# Patient Record
Sex: MALE | Race: WHITE | NOT HISPANIC OR LATINO | Employment: FULL TIME | URBAN - NONMETROPOLITAN AREA
[De-identification: names, ages, dates, MRNs, and addresses within clinical notes are randomized per-mention and may not be internally consistent; named-entity substitution may affect disease eponyms.]

---

## 2017-03-17 ENCOUNTER — HOSPITAL ENCOUNTER (EMERGENCY)
Facility: HOSPITAL | Age: 57
Discharge: HOME | End: 2017-03-17
Attending: EMERGENCY MEDICINE | Admitting: EMERGENCY MEDICINE

## 2017-03-17 ENCOUNTER — APPOINTMENT (OUTPATIENT)
Dept: GENERAL RADIOLOGY | Facility: HOSPITAL | Age: 57
End: 2017-03-17

## 2017-03-17 ENCOUNTER — APPOINTMENT (OUTPATIENT)
Dept: CT IMAGING | Facility: HOSPITAL | Age: 57
End: 2017-03-17

## 2017-03-17 VITALS
TEMPERATURE: 97.9 F | DIASTOLIC BLOOD PRESSURE: 69 MMHG | SYSTOLIC BLOOD PRESSURE: 135 MMHG | RESPIRATION RATE: 15 BRPM | WEIGHT: 235 LBS | HEIGHT: 68 IN | HEART RATE: 75 BPM | OXYGEN SATURATION: 98 % | BODY MASS INDEX: 35.61 KG/M2

## 2017-03-17 DIAGNOSIS — G44.219 EPISODIC TENSION-TYPE HEADACHE, NOT INTRACTABLE: ICD-10-CM

## 2017-03-17 DIAGNOSIS — R07.89 CHEST PAIN, ATYPICAL: Primary | ICD-10-CM

## 2017-03-17 LAB
ALBUMIN SERPL-MCNC: 5.1 G/DL (ref 3.5–5)
ALBUMIN/GLOB SERPL: 1.8 G/DL (ref 1.5–2.5)
ALP SERPL-CCNC: 75 U/L (ref 40–129)
ALT SERPL W P-5'-P-CCNC: 32 U/L (ref 10–44)
ANION GAP SERPL CALCULATED.3IONS-SCNC: 8.1 MMOL/L (ref 3.6–11.2)
AST SERPL-CCNC: 32 U/L (ref 10–34)
BASOPHILS # BLD AUTO: 0.02 10*3/MM3 (ref 0–0.3)
BASOPHILS NFR BLD AUTO: 0.4 % (ref 0–2)
BILIRUB SERPL-MCNC: 0.8 MG/DL (ref 0.2–1.8)
BUN BLD-MCNC: 18 MG/DL (ref 7–21)
BUN/CREAT SERPL: 16.1 (ref 7–25)
CALCIUM SPEC-SCNC: 10.2 MG/DL (ref 7.7–10)
CHLORIDE SERPL-SCNC: 99 MMOL/L (ref 99–112)
CO2 SERPL-SCNC: 27.9 MMOL/L (ref 24.3–31.9)
CREAT BLD-MCNC: 1.12 MG/DL (ref 0.43–1.29)
DEPRECATED RDW RBC AUTO: 41.6 FL (ref 37–54)
EOSINOPHIL # BLD AUTO: 0.16 10*3/MM3 (ref 0–0.7)
EOSINOPHIL NFR BLD AUTO: 3.1 % (ref 0–5)
ERYTHROCYTE [DISTWIDTH] IN BLOOD BY AUTOMATED COUNT: 13.5 % (ref 11.5–14.5)
GFR SERPL CREATININE-BSD FRML MDRD: 68 ML/MIN/1.73
GLOBULIN UR ELPH-MCNC: 2.9 GM/DL
GLUCOSE BLD-MCNC: 264 MG/DL (ref 70–110)
HCT VFR BLD AUTO: 46.8 % (ref 42–52)
HGB BLD-MCNC: 15.5 G/DL (ref 14–18)
IMM GRANULOCYTES # BLD: 0.03 10*3/MM3 (ref 0–0.03)
IMM GRANULOCYTES NFR BLD: 0.6 % (ref 0–0.5)
INR PPP: 0.92 (ref 0.8–1.1)
LYMPHOCYTES # BLD AUTO: 1.19 10*3/MM3 (ref 1–3)
LYMPHOCYTES NFR BLD AUTO: 23 % (ref 21–51)
MCH RBC QN AUTO: 27.8 PG (ref 27–33)
MCHC RBC AUTO-ENTMCNC: 33.1 G/DL (ref 33–37)
MCV RBC AUTO: 84 FL (ref 80–94)
MONOCYTES # BLD AUTO: 0.55 10*3/MM3 (ref 0.1–0.9)
MONOCYTES NFR BLD AUTO: 10.6 % (ref 0–10)
NEUTROPHILS # BLD AUTO: 3.22 10*3/MM3 (ref 1.4–6.5)
NEUTROPHILS NFR BLD AUTO: 62.3 % (ref 30–70)
OSMOLALITY SERPL CALC.SUM OF ELEC: 281.2 MOSM/KG (ref 273–305)
PLATELET # BLD AUTO: 150 10*3/MM3 (ref 130–400)
PMV BLD AUTO: 10.8 FL (ref 6–10)
POTASSIUM BLD-SCNC: 4.4 MMOL/L (ref 3.5–5.3)
PROT SERPL-MCNC: 8 G/DL (ref 6–8)
PROTHROMBIN TIME: 10.2 SECONDS (ref 9.8–11.9)
RBC # BLD AUTO: 5.57 10*6/MM3 (ref 4.7–6.1)
SODIUM BLD-SCNC: 135 MMOL/L (ref 135–153)
TROPONIN I SERPL-MCNC: <0.006 NG/ML
TSH SERPL DL<=0.05 MIU/L-ACNC: 0.01 MIU/ML (ref 0.55–4.78)
WBC NRBC COR # BLD: 5.17 10*3/MM3 (ref 4.5–12.5)
WHOLE BLOOD HOLD SPECIMEN: NORMAL
WHOLE BLOOD HOLD SPECIMEN: NORMAL

## 2017-03-17 PROCEDURE — 85610 PROTHROMBIN TIME: CPT | Performed by: NURSE PRACTITIONER

## 2017-03-17 PROCEDURE — 71010 HC CHEST PA OR AP: CPT

## 2017-03-17 PROCEDURE — 80053 COMPREHEN METABOLIC PANEL: CPT | Performed by: NURSE PRACTITIONER

## 2017-03-17 PROCEDURE — 70450 CT HEAD/BRAIN W/O DYE: CPT

## 2017-03-17 PROCEDURE — 99285 EMERGENCY DEPT VISIT HI MDM: CPT

## 2017-03-17 PROCEDURE — 71010 XR CHEST 1 VW: CPT | Performed by: RADIOLOGY

## 2017-03-17 PROCEDURE — 93010 ELECTROCARDIOGRAM REPORT: CPT | Performed by: INTERNAL MEDICINE

## 2017-03-17 PROCEDURE — 93005 ELECTROCARDIOGRAM TRACING: CPT | Performed by: NURSE PRACTITIONER

## 2017-03-17 PROCEDURE — 85025 COMPLETE CBC W/AUTO DIFF WBC: CPT | Performed by: NURSE PRACTITIONER

## 2017-03-17 PROCEDURE — 84443 ASSAY THYROID STIM HORMONE: CPT | Performed by: NURSE PRACTITIONER

## 2017-03-17 PROCEDURE — 84484 ASSAY OF TROPONIN QUANT: CPT | Performed by: NURSE PRACTITIONER

## 2017-03-17 PROCEDURE — 70450 CT HEAD/BRAIN W/O DYE: CPT | Performed by: RADIOLOGY

## 2017-03-17 RX ORDER — SODIUM CHLORIDE 0.9 % (FLUSH) 0.9 %
10 SYRINGE (ML) INJECTION AS NEEDED
Status: DISCONTINUED | OUTPATIENT
Start: 2017-03-17 | End: 2017-03-17 | Stop reason: HOSPADM

## 2017-03-17 RX ORDER — METOPROLOL TARTRATE 100 MG/1
100 TABLET ORAL 2 TIMES DAILY
COMMUNITY

## 2017-03-17 RX ORDER — GLIMEPIRIDE 4 MG/1
4 TABLET ORAL 2 TIMES DAILY
COMMUNITY

## 2017-03-17 RX ORDER — OMEPRAZOLE 40 MG/1
40 CAPSULE, DELAYED RELEASE ORAL DAILY
COMMUNITY
End: 2021-02-25 | Stop reason: DRUGHIGH

## 2017-03-17 RX ORDER — ASPIRIN 81 MG/1
81 TABLET, CHEWABLE ORAL DAILY
COMMUNITY

## 2017-03-17 RX ORDER — ATORVASTATIN CALCIUM 40 MG/1
40 TABLET, FILM COATED ORAL DAILY
COMMUNITY
End: 2021-02-25 | Stop reason: DRUGHIGH

## 2017-03-17 RX ORDER — LOSARTAN POTASSIUM 25 MG/1
25 TABLET ORAL DAILY
COMMUNITY
End: 2021-02-25 | Stop reason: DRUGHIGH

## 2017-03-17 RX ORDER — LEVOTHYROXINE SODIUM 0.15 MG/1
150 TABLET ORAL DAILY
COMMUNITY
End: 2021-02-25 | Stop reason: DRUGHIGH
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
## 2017-03-17 NOTE — DISCHARGE INSTRUCTIONS

## 2021-01-15 ENCOUNTER — TELEPHONE (OUTPATIENT)
Dept: SURGERY | Facility: CLINIC | Age: 61
End: 2021-01-15

## 2021-01-15 NOTE — TELEPHONE ENCOUNTER
Patient is requesting bariatric consult. Took down patient's information and bariatric packet will be mailed to him.

## 2021-01-18 ENCOUNTER — TELEPHONE (OUTPATIENT)
Dept: SURGERY | Facility: CLINIC | Age: 61
End: 2021-01-18

## 2021-02-01 ENCOUNTER — TELEPHONE (OUTPATIENT)
Dept: SURGERY | Facility: CLINIC | Age: 61
End: 2021-02-01

## 2021-02-25 ENCOUNTER — OFFICE VISIT (OUTPATIENT)
Dept: SURGERY | Facility: CLINIC | Age: 61
End: 2021-02-25

## 2021-02-25 VITALS
BODY MASS INDEX: 36.34 KG/M2 | HEIGHT: 68 IN | DIASTOLIC BLOOD PRESSURE: 61 MMHG | HEART RATE: 81 BPM | TEMPERATURE: 98 F | WEIGHT: 239.8 LBS | RESPIRATION RATE: 17 BRPM | OXYGEN SATURATION: 98 % | SYSTOLIC BLOOD PRESSURE: 142 MMHG

## 2021-02-25 DIAGNOSIS — E66.01 MORBID OBESITY (HCC): Primary | ICD-10-CM

## 2021-02-25 PROCEDURE — 99204 OFFICE O/P NEW MOD 45 MIN: CPT | Performed by: SURGERY

## 2021-02-25 RX ORDER — EMPAGLIFLOZIN 25 MG/1
TABLET, FILM COATED ORAL
COMMUNITY

## 2021-02-25 RX ORDER — ATORVASTATIN CALCIUM 80 MG/1
TABLET, FILM COATED ORAL
COMMUNITY

## 2021-02-25 RX ORDER — LOSARTAN POTASSIUM AND HYDROCHLOROTHIAZIDE 12.5; 1 MG/1; MG/1
TABLET ORAL
COMMUNITY

## 2021-02-25 RX ORDER — SODIUM CHLORIDE 0.9 % (FLUSH) 0.9 %
10 SYRINGE (ML) INJECTION AS NEEDED
Status: CANCELLED | OUTPATIENT
Start: 2021-03-30

## 2021-02-25 RX ORDER — ERGOCALCIFEROL 1.25 MG/1
50000 CAPSULE ORAL WEEKLY
COMMUNITY
Start: 2021-02-04

## 2021-02-25 RX ORDER — METFORMIN HYDROCHLORIDE 500 MG/1
TABLET, EXTENDED RELEASE ORAL
COMMUNITY

## 2021-02-25 RX ORDER — SODIUM CHLORIDE 0.9 % (FLUSH) 0.9 %
3 SYRINGE (ML) INJECTION EVERY 12 HOURS SCHEDULED
Status: CANCELLED | OUTPATIENT
Start: 2021-03-30

## 2021-02-25 RX ORDER — PANTOPRAZOLE SODIUM 40 MG/1
40 TABLET, DELAYED RELEASE ORAL DAILY
COMMUNITY
Start: 2021-01-03

## 2021-02-25 RX ORDER — LEVOCETIRIZINE DIHYDROCHLORIDE 5 MG/1
TABLET, FILM COATED ORAL
COMMUNITY

## 2021-02-25 RX ORDER — PREGABALIN 150 MG/1
CAPSULE ORAL
COMMUNITY

## 2021-02-25 RX ORDER — LEVOTHYROXINE SODIUM 112 UG/1
TABLET ORAL
COMMUNITY

## 2021-02-25 NOTE — PROGRESS NOTES
Date of Service: 2/25/2021    Subjective   Venu Peralta is a 60 y.o. male is being seen for consultation for morbid  obesity today at the request of his primary care physician    Chief complaint: Obesity    Venu Peralta is a 60 y.o. morbidly obese male with poorly controlled diabetes mellitus, hypertension, hyperlipidemia, coronary artery disease, gastroesophageal reflux disease controlled on omeprazole, obstructive sleep apnea requiring CPAP.  Patient has been obese since childhood to a maximum weight of 300 pounds.  He has siblings, 1 of which had a sleeve gastrectomy and the other had a gastric bypass, both with good results.  He has not trialed any diets.  He did however get to his lowest 218 pounds and did notice his glucose measurements decreasing slightly.  The patient's main concern is getting off of his medications for diabetes and hypertension.  Due to his work as a , he wants to avoid insulin and states his last hemoglobin A1c was upwards of 10%.      General diet: Drinks upwards of 10 diet sodas per day.  While working, his meal choices are limited to fast food.    Exercise: Patient is not very active.    Takes a daily aspirin.  Reports exertional chest pain.  Has had heart catheterizations in the past but no stenting.  Has a cardiologist in [place] but has forgotten the name.  Has obstructive sleep apnea requiring CPAP  Poorly controlled diabetes mellitus, the patient is avoiding taking insulin due to his work.  States his last hemoglobin A1c was upwards of 10%  Reports a history of a hiatal hernia with gastroesophageal reflux disease that is well controlled on omeprazole      02/25/21  1304   Weight: 109 kg (239 lb 12.8 oz)     Height 173 cm  Body mass index is 36.47 kg/m².    Adjusted Ideal Body Weight: 186 pounds  Excess Body Weight (Weight-IBW): 53 pounds    Past Medical History:   Diagnosis Date   • CAD (coronary artery disease)    • Diabetes mellitus (CMS/[facility])    • Disease of  "thyroid gland    • Hyperlipidemia    • Hypertension    • Sleep apnea    • Stomach ulcer        Past Surgical History:   Procedure Laterality Date   • CARDIAC CATHETERIZATION     • CHOLECYSTECTOMY     • CIRCUMCISION     • THYROID SURGERY           Family History   Problem Relation Age of Onset   • Cancer Mother    • Hypertension Father    • Heart disease Father    • Hypertension Sister    • Diabetes Sister    • Hypertension Brother    • Heart disease Brother    • Diabetes Brother          Social History     Socioeconomic History   • Marital status:      Spouse name: Not on file   • Number of children: Not on file   • Years of education: Not on file   • Highest education level: Not on file   Tobacco Use   • Smoking status: Never Smoker   • Smokeless tobacco: Never Used   Substance and Sexual Activity   • Alcohol use: No   • Drug use: No   • Sexual activity: Defer                Review of Systems        Constitutional: No fevers, chills.  Reports malaise. No unintentional weight loss.  Reports weight gain   Eyes: Reports chronic vision problems   Cardiovascular: Reports exertional chest pain with activity.  Occasional ankle swelling   Respiratory: Denies cough or shortness of breath   Abdominal/Gastrointestinal: Reports gastroesophageal reflux disease without abdominal pain or vomiting.   Genitourinary: Denies dysuria or hematuria.  Reports urinary hesitancy   Musculoskeletal: Reports multiple chronic joint pains              Skin: No lesions or rashes   Psychiatric: No recent mood changes   Neurologic: No paresthesias or loss of function.  Reports intermittent dizziness.         Objective     Physical Exam:      02/25/21  1304   Weight: 109 kg (239 lb 12.8 oz)    Body mass index is 36.47 kg/m².  Constitution: /61   Pulse 81   Temp 98 °F (36.7 °C)   Resp 17   Ht 172.7 cm (67.99\")   Wt 109 kg (239 lb 12.8 oz)   SpO2 98%   BMI 36.47 kg/m²  . No acute distress. Morbidly  obese  Head: Normocephalic, " atraumatic.   Eyes: Aligned without strabismus. Conjunctiva noninjected   Ears, Nose, Mouth: No lesions appreciated   CV: Rhythm  and rate regular   Respiratory: Symmetric chest expansion. No respiratory distress.   Gastrointestinal:  soft, nondistended, nontender  Skin:  No cyanosis, clubbing or edema bilaterally    Lymphatics: No abnormal cervical or supraclavicular adenopathy appreciated   Neurologic: No gross deficits   Psychiatric: alert and oriented x3            Assessment     Venu Peralta is a 60 y.o. morbidly  obese (Body mass index is 36.47 kg/m².) male with poorly controlled diabetes mellitus, hypertension, hyperlipidemia, coronary artery disease, gastroesophageal reflux disease with reported hiatal hernia, obstructive sleep apnea requiring CPAP.    We had an extensive discussion regarding the risks of sleeve gastrectomy, manage expectations, and the preoperative work-up.    The patient will be referred to nutrition to discuss dietary modifications, as I will expect the patient to lose 10% (5 to 6 pounds) of their excess body weight on their own prior to being offered sleeve gastrectomy.  This will help the patient establish positive dietary habits that will help them in the long run and maximize the patient's weight loss post surgery.  I think the crux of the patient's issue may be related to work as a  and lack of access to healthy meal choices.  There is a possibility that dietary modifications alone with nutritionist's input may yield positive results since the patient only has roughly 50 pounds of excess body weight.    The patient will be referred to psychiatry to better understand the patient's motives for weight loss, coping mechanisms, and possible contributing factors to the patient's morbid  obesity    In the meantime, I will obtain an upper GI study to rule out hiatal hernia which may make the patient a poor surgical candidate for sleeve gastrectomy if there is a large hiatal  hernia, as well as upper endoscopy.    The patient does report and review of systems exertional chest pain.  He has had several heart caths in the past but never been stented.  I will ask the patient's cardiologist to clear him for surgery, and determine the need for stress testing         Félix Covington MD  [facility]

## 2021-02-26 PROBLEM — E66.01 MORBID OBESITY (HCC): Status: ACTIVE | Noted: 2021-02-26

## 2024-09-24 ENCOUNTER — SPECIALTY PHARMACY (OUTPATIENT)
Dept: GENERAL RADIOLOGY | Facility: HOSPITAL | Age: 64
End: 2024-09-24
Payer: MEDICAID

## 2024-09-24 ENCOUNTER — OFFICE VISIT (OUTPATIENT)
Age: 64
End: 2024-09-24
Payer: MEDICAID

## 2024-09-24 VITALS
OXYGEN SATURATION: 98 % | SYSTOLIC BLOOD PRESSURE: 128 MMHG | HEART RATE: 80 BPM | WEIGHT: 235 LBS | HEIGHT: 68 IN | BODY MASS INDEX: 35.61 KG/M2 | DIASTOLIC BLOOD PRESSURE: 68 MMHG

## 2024-09-24 DIAGNOSIS — Z79.4 UNCONTROLLED DIABETES MELLITUS WITH HYPERGLYCEMIA, WITH LONG-TERM CURRENT USE OF INSULIN: Primary | ICD-10-CM

## 2024-09-24 DIAGNOSIS — E11.65 UNCONTROLLED DIABETES MELLITUS WITH HYPERGLYCEMIA, WITH LONG-TERM CURRENT USE OF INSULIN: Primary | ICD-10-CM

## 2024-09-24 PROCEDURE — 99203 OFFICE O/P NEW LOW 30 MIN: CPT | Performed by: INTERNAL MEDICINE

## 2024-09-24 RX ORDER — INSULIN LISPRO 200 [IU]/ML
30 INJECTION, SOLUTION SUBCUTANEOUS
Qty: 90 ML | Refills: 1 | Status: SHIPPED | OUTPATIENT
Start: 2024-09-24

## 2024-09-24 RX ORDER — METFORMIN HCL 500 MG
1000 TABLET, EXTENDED RELEASE 24 HR ORAL
Qty: 180 TABLET | Refills: 1 | Status: SHIPPED | OUTPATIENT
Start: 2024-09-24

## 2024-09-24 RX ORDER — INSULIN GLARGINE 100 [IU]/ML
55 INJECTION, SOLUTION SUBCUTANEOUS 2 TIMES DAILY
Qty: 90 ML | Refills: 1 | Status: SHIPPED | OUTPATIENT
Start: 2024-09-24

## 2024-10-07 ENCOUNTER — SPECIALTY PHARMACY (OUTPATIENT)
Age: 64
End: 2024-10-07
Payer: MEDICAID

## 2024-10-07 NOTE — PROGRESS NOTES
Specialty Pharmacy Patient Management Program  Endocrinology Initial Fill Outreach      Venu is a 64 y.o. male contacted today regarding initial fill of his medication(s).    Specialty medication(s) and dose(s) confirmed: Jardiance, Mounjaro.    Delivery Questions      Flowsheet Row Most Recent Value   Delivery method UPS   Delivery address verified with patient/caregiver? Yes   Delivery address Home   Number of medications in delivery 4   Medication(s) being filled and delivered Empagliflozin, Insulin Lispro, Tirzepatide, Metformin Hcl (Biguanides)   Doses left of specialty medications inifital fill   Copay verified? Yes   Copay amount $0   Copay form of payment No copayment ($0)   Ship Date 10/8   Delivery Date 10/9   Signature Required Yes            Follow-Up: 90d    Gustavo Bradford CPhT  Pharmacy Care Coordinator, Endocrinology  10/7/2024  11:40 EDT

## 2024-10-28 ENCOUNTER — SPECIALTY PHARMACY (OUTPATIENT)
Age: 64
End: 2024-10-28
Payer: MEDICAID

## 2024-10-28 NOTE — PROGRESS NOTES
Specialty Pharmacy Patient Management Program  Endocrinology Initial Fill Outreach      Venu is a 64 y.o. male contacted today regarding initial fill of his medication(s).    Specialty medication(s) and dose(s) confirmed: Lantus SoloStar.    Delivery Questions      Flowsheet Row Most Recent Value   Delivery method UPS   Delivery address verified with patient/caregiver? Yes   Delivery address Home   Number of medications in delivery 1   Medication(s) being filled and delivered Insulin Glargine (Lantus SoloStar)   Doses left of specialty medications inifital fill   Copay verified? Yes   Copay amount $0   Copay form of payment No copayment ($0)   Ship Date 10/29   Delivery Date 10/30   Signature Required Yes            Follow-Up: 82d    Gustavo Bradford CPhT  Pharmacy Care Coordinator, Endocrinology  10/28/2024  13:31 EDT

## 2024-10-30 ENCOUNTER — SPECIALTY PHARMACY (OUTPATIENT)
Age: 64
End: 2024-10-30
Payer: MEDICAID

## 2024-11-25 ENCOUNTER — SPECIALTY PHARMACY (OUTPATIENT)
Age: 64
End: 2024-11-25
Payer: MEDICAID

## 2024-12-19 ENCOUNTER — SPECIALTY PHARMACY (OUTPATIENT)
Age: 64
End: 2024-12-19
Payer: MEDICAID

## 2024-12-30 ENCOUNTER — SPECIALTY PHARMACY (OUTPATIENT)
Dept: GENERAL RADIOLOGY | Facility: HOSPITAL | Age: 64
End: 2024-12-30
Payer: MEDICAID

## 2024-12-30 RX ORDER — PEN NEEDLE, DIABETIC 31 GX5/16"
1 NEEDLE, DISPOSABLE MISCELLANEOUS
Qty: 500 EACH | Refills: 1 | Status: SHIPPED | OUTPATIENT
Start: 2024-12-30

## 2024-12-30 NOTE — PROGRESS NOTES
Specialty Pharmacy Patient Management Program  Per Protocol Prescription Order/Refill     Patient currently fills medications at [facility] and is enrolled in an Endocrinology Patient Management Program.     Requested Prescriptions     Pending Prescriptions Disp Refills    Insulin Pen Needle (B-D ULTRAFINE III SHORT PEN) 31G X 8 MM misc 500 each 1     Sig: Use 1 Needle 5 (Five) Times a Day.     Prescription orders above were sent to the pharmacy per Collaborative Care Agreement Protocol.     Erick Sanchez, PharmD  Clinical Specialty Pharmacist, Endocrinology  12/30/2024  11:40 EST

## 2025-01-10 ENCOUNTER — OFFICE VISIT (OUTPATIENT)
Age: 65
End: 2025-01-10
Payer: MEDICAID

## 2025-01-10 VITALS
SYSTOLIC BLOOD PRESSURE: 128 MMHG | OXYGEN SATURATION: 98 % | DIASTOLIC BLOOD PRESSURE: 62 MMHG | HEIGHT: 68 IN | BODY MASS INDEX: 34.25 KG/M2 | WEIGHT: 226 LBS | HEART RATE: 76 BPM

## 2025-01-10 DIAGNOSIS — Z79.4 UNCONTROLLED DIABETES MELLITUS WITH HYPERGLYCEMIA, WITH LONG-TERM CURRENT USE OF INSULIN: Primary | ICD-10-CM

## 2025-01-10 DIAGNOSIS — E11.65 UNCONTROLLED DIABETES MELLITUS WITH HYPERGLYCEMIA, WITH LONG-TERM CURRENT USE OF INSULIN: Primary | ICD-10-CM

## 2025-01-10 PROCEDURE — 99213 OFFICE O/P EST LOW 20 MIN: CPT | Performed by: INTERNAL MEDICINE

## 2025-01-10 PROCEDURE — 1159F MED LIST DOCD IN RCRD: CPT | Performed by: INTERNAL MEDICINE

## 2025-01-10 PROCEDURE — 1160F RVW MEDS BY RX/DR IN RCRD: CPT | Performed by: INTERNAL MEDICINE

## 2025-01-10 RX ORDER — FERROUS SULFATE 325(65) MG
325 TABLET ORAL EVERY 12 HOURS SCHEDULED
COMMUNITY
Start: 2024-11-22

## 2025-01-10 RX ORDER — VALACYCLOVIR HYDROCHLORIDE 1 G/1
1000 TABLET, FILM COATED ORAL DAILY
COMMUNITY
Start: 2024-12-30

## 2025-01-10 RX ORDER — FLUTICASONE PROPIONATE 50 MCG
1 SPRAY, SUSPENSION (ML) NASAL DAILY
COMMUNITY
Start: 2024-09-13

## 2025-01-10 RX ORDER — BENZONATATE 100 MG/1
100 CAPSULE ORAL 2 TIMES DAILY PRN
COMMUNITY
Start: 2024-12-30

## 2025-01-10 RX ORDER — FOLIC ACID 0.8 MG
1 TABLET ORAL DAILY
COMMUNITY
Start: 2024-12-29 | End: 2025-03-29

## 2025-01-10 RX ORDER — SUCRALFATE 1 G/1
1 TABLET ORAL 4 TIMES DAILY
COMMUNITY
Start: 2024-11-22 | End: 2025-01-21

## 2025-01-10 RX ORDER — PROCHLORPERAZINE 25 MG/1
SUPPOSITORY RECTAL
COMMUNITY
Start: 2024-12-06

## 2025-01-10 RX ORDER — ROSUVASTATIN CALCIUM 40 MG/1
40 TABLET, COATED ORAL NIGHTLY
COMMUNITY
Start: 2024-12-30

## 2025-01-10 RX ORDER — AMLODIPINE BESYLATE 10 MG/1
10 TABLET ORAL DAILY
COMMUNITY
Start: 2024-12-19

## 2025-01-10 RX ORDER — DULOXETIN HYDROCHLORIDE 60 MG/1
CAPSULE, DELAYED RELEASE ORAL DAILY
COMMUNITY
Start: 2024-12-30

## 2025-01-10 RX ORDER — FENOFIBRATE 145 MG/1
145 TABLET, COATED ORAL DAILY
COMMUNITY
Start: 2024-12-19

## 2025-01-10 RX ORDER — LOSARTAN POTASSIUM 100 MG/1
100 TABLET ORAL DAILY
COMMUNITY
Start: 2024-12-30

## 2025-01-10 NOTE — ASSESSMENT & PLAN NOTE
UNSTABLE. STILL NOT AT GOAL  THE PLAN IS TO INCREASE MOUNJAOR TO MAX DOSE AND START THE PROCESS TOWARDS PUMP THERAPY. I RECOMMEND THE TANDEM PUMP FOR HIM

## 2025-01-10 NOTE — PROGRESS NOTES
"     Office Note      Date: 01/10/2025  Patient Name: Venu Peralta  MRN: [id number]  : 1960    Chief Complaint   Patient presents with    Uncontrolled diabetes mellitus with hyperglycemia, with cory       History of Present Illness:   Venu Peralta is a 64 y.o. male who presents for Diabetes type 2.   Current RX humalog/ lantus/ mounjaro jardiance - THE PLAN HAS BEEN TO INCREASE THE MOUNJARO BUT IT HAS BEEN TOO SOON   He is on arb and statin     Bg checks are done: G6.. HE LEFT IT AT HOME SO WE COULD NOT DOWNLOAD BUT THE AVERAGE IS STILL 180-200 AS HE RECALLS   Hypoglycemia :NOTHING SERIOUS       Last A1c: 8.0 IN NOVEMBER       Changes in health since last visit: NONE . Last eye exam WITHIN THE LAST 6 MONTHS .    Subjective              Review of Systems:   Review of Systems   Respiratory:  Negative for chest tightness and shortness of breath.        The following portions of the patient's history were reviewed and updated as appropriate: allergies, current medications, past family history, past medical history, past social history, past surgical history, and problem list.    Objective     Visit Vitals  /62 (BP Location: Left arm, Patient Position: Sitting)   Pulse 76   Ht 172.7 cm (68\")   Wt 103 kg (226 lb)   SpO2 98%   BMI 34.36 kg/m²           Physical Exam:  Physical Exam  Vitals reviewed.   Constitutional:       Appearance: Normal appearance. He is normal weight.   Cardiovascular:      Pulses:           Dorsalis pedis pulses are 2+ on the right side and 2+ on the left side.        Posterior tibial pulses are 2+ on the right side and 2+ on the left side.   Musculoskeletal:      Right foot: Normal range of motion. No deformity, bunion, Charcot foot, foot drop or prominent metatarsal heads.      Left foot: Normal range of motion. No deformity, bunion, Charcot foot, foot drop or prominent metatarsal heads.   Feet:      Right foot:      Protective Sensation: 10 sites tested.  6 sites sensed.      " Skin integrity: Skin integrity normal. Dry skin present.      Toenail Condition: Right toenails are normal.      Left foot:      Protective Sensation: 10 sites tested.  6 sites sensed.      Skin integrity: Skin integrity normal. Dry skin present.      Toenail Condition: Left toenails are normal.      Comments: Diabetic Foot Exam Performed    Neurological:      Mental Status: He is alert.   Psychiatric:         Mood and Affect: Mood normal.         Behavior: Behavior normal.         Thought Content: Thought content normal.         Judgment: Judgment normal.          Assessment / Plan      Assessment & Plan:  Problem List Items Addressed This Visit       Uncontrolled diabetes mellitus with hyperglycemia, with long-term current use of insulin - Primary    Current Assessment & Plan     UNSTABLE. STILL NOT AT GOAL  THE PLAN IS TO INCREASE MOUNJAOR TO MAX DOSE AND START THE PROCESS TOWARDS PUMP THERAPY. I RECOMMEND THE TANDEM PUMP FOR HIM         Relevant Medications    empagliflozin (Jardiance) 25 MG tablet tablet    Tirzepatide 12.5 MG/0.5ML solution auto-injector    Insulin Lispro (HumaLOG KwikPen) 200 UNIT/ML solution pen-injector    Insulin Glargine (Lantus SoloStar) 100 UNIT/ML injection pen    metFORMIN ER (GLUCOPHAGE-XR) 500 MG 24 hr tablet         Electronically signed by : Alexander Butts MD  01/10/2025

## 2025-02-12 ENCOUNTER — SPECIALTY PHARMACY (OUTPATIENT)
Dept: GENERAL RADIOLOGY | Facility: HOSPITAL | Age: 65
End: 2025-02-12
Payer: MEDICAID

## 2025-02-12 RX ORDER — PROCHLORPERAZINE 25 MG/1
SUPPOSITORY RECTAL
Qty: 3 EACH | Refills: 5 | Status: SHIPPED | OUTPATIENT
Start: 2025-02-12

## 2025-02-12 NOTE — PROGRESS NOTES
Specialty Pharmacy Patient Management Program  Per Protocol Prescription Order/Refill     Patient currently fills medications at [facility] and is enrolled in an Endocrinology Patient Management Program.     Requested Prescriptions     Pending Prescriptions Disp Refills    Continuous Glucose Sensor (Dexcom G6 Sensor) 3 each 5     Sig: Inject  under the skin into the appropriate area as directed Every 10 (Ten) Days.     Prescription orders above were sent to the pharmacy per Collaborative Care Agreement Protocol.     Erick Sanchez, PharmD  Clinical Specialty Pharmacist, Endocrinology  2/12/2025  14:12 EST

## 2025-02-21 ENCOUNTER — SPECIALTY PHARMACY (OUTPATIENT)
Age: 65
End: 2025-02-21
Payer: MEDICAID

## 2025-02-21 NOTE — PROGRESS NOTES
Specialty Pharmacy Patient Management Program  Endocrinology Refill Outreach      Venu is a 64 y.o. male contacted today regarding refills of his medication(s).    Specialty medication(s) and dose(s) confirmed: Lantus, Mounjaro.    Refill Questions      Flowsheet Row Most Recent Value   Changes to allergies? No   Changes to medications? No   New conditions or infections since last clinic visit No   Unplanned office visit, urgent care, ED, or hospital admission in the last 4 weeks  No   How does patient/caregiver feel medication is working? Very good   Financial problems or insurance changes  No   Since the previous refill, were any specialty medication doses or scheduled injections missed or delayed?  No   Does this patient require a clinical escalation to a pharmacist? No          Delivery Questions      Flowsheet Row Most Recent Value   Delivery method UPS   Delivery address verified with patient/caregiver? Yes   Delivery address Home   Number of medications in delivery 3   Medication(s) being filled and delivered Insulin Lispro (HumaLOG KwikPen), Insulin Glargine (Lantus SoloStar), Tirzepatide (MOUNJARO)   Copay verified? Yes   Copay amount $0   Copay form of payment No copayment ($0)   Delivery Date Selection 02/22/25   Signature Required Yes            Follow-Up: 28d    Gustavo Bradford CPhT  Pharmacy Care Coordinator, Endocrinology  2/21/2025  09:28 EST

## 2025-02-26 RX ORDER — INSULIN LISPRO 100 [IU]/ML
INJECTION, SOLUTION INTRAVENOUS; SUBCUTANEOUS
Qty: 60 ML | Refills: 2 | Status: SHIPPED | OUTPATIENT
Start: 2025-02-26

## 2025-03-06 ENCOUNTER — TELEPHONE (OUTPATIENT)
Dept: ENDOCRINOLOGY | Facility: CLINIC | Age: 65
End: 2025-03-06
Payer: MEDICAID

## 2025-03-06 RX ORDER — INSULIN LISPRO 100 [IU]/ML
200 INJECTION, SOLUTION INTRAVENOUS; SUBCUTANEOUS DAILY
Qty: 60 ML | Refills: 2 | Status: SHIPPED | OUTPATIENT
Start: 2025-03-06

## 2025-03-06 RX ORDER — PEN NEEDLE, DIABETIC 31 GX5/16"
1 NEEDLE, DISPOSABLE MISCELLANEOUS
Qty: 500 EACH | Refills: 1 | Status: SHIPPED | OUTPATIENT
Start: 2025-03-06

## 2025-03-06 RX ORDER — NAPROXEN SODIUM 220 MG
1 TABLET ORAL TAKE AS DIRECTED
Qty: 100 EACH | Refills: 2 | Status: SHIPPED | OUTPATIENT
Start: 2025-03-06

## 2025-03-06 NOTE — TELEPHONE ENCOUNTER
"Rx Refill Note  Requested Prescriptions     Pending Prescriptions Disp Refills    Insulin Lispro (humaLOG) 100 UNIT/ML injection 60 mL 2     Sig: For use in insulin pump.  Maximum Daily Dose: 200 units.    Insulin Pen Needle (B-D ULTRAFINE III SHORT PEN) 31G X 8 MM misc 500 each 1     Sig: Use 1 Needle 5 (Five) Times a Day.    Insulin Syringe 30G X 5/16\" 1 ML misc 100 each 2     Sig: As directed      Last office visit with prescribing clinician: 1/10/2025      Next office visit with prescribing clinician: 5/22/2025       Gayla Shah MA  03/06/25, 15:36 EST   "

## 2025-03-06 NOTE — TELEPHONE ENCOUNTER
PT CALLED STATING HIS PCP SENT IN MOUNJARO 15.0 DOSE AND IT WAS DENIED. PT STATED HIS PCP WANTS US TO TAKE OVER RX. RX WAS SENT TO  PHARM.     PT ALSO REQUESTED LISPRO VIALS, SYRINGES AND NEEDLES Rxs TO BE SENT IN TO  PHARM.

## 2025-03-13 ENCOUNTER — SPECIALTY PHARMACY (OUTPATIENT)
Dept: GENERAL RADIOLOGY | Facility: HOSPITAL | Age: 65
End: 2025-03-13
Payer: MEDICAID

## 2025-03-13 RX ORDER — TIRZEPATIDE 15 MG/.5ML
15 INJECTION, SOLUTION SUBCUTANEOUS WEEKLY
Qty: 6 ML | Refills: 1 | Status: SHIPPED | OUTPATIENT
Start: 2025-03-13

## 2025-03-13 RX ORDER — TIRZEPATIDE 15 MG/.5ML
15 INJECTION, SOLUTION SUBCUTANEOUS WEEKLY
Qty: 6 ML | Refills: 1 | Status: SHIPPED | OUTPATIENT
Start: 2025-03-13 | End: 2025-03-13 | Stop reason: SDUPTHER

## 2025-03-13 NOTE — PROGRESS NOTES
Specialty Pharmacy Patient Management Program  Per Protocol Prescription Order/Refill     Patient currently fills medications at [facility] and is enrolled in an Endocrinology Patient Management Program. Patient states he is tolerating mounjaro 12.5mg weekly well and ready to increase to 15mg weekly per plan    Requested Prescriptions     Pending Prescriptions Disp Refills    Tirzepatide (Mounjaro) 15 MG/0.5ML solution auto-injector 6 mL 1     Sig: Inject 15 mg under the skin into the appropriate area as directed 1 (One) Time Per Week.     Prescription orders above were sent to the pharmacy per Collaborative Care Agreement Protocol.     Erick Sanchez, PharmD  Clinical Specialty Pharmacist, Endocrinology  3/13/2025  13:54 EDT

## 2025-03-13 NOTE — PROGRESS NOTES
Specialty Pharmacy Patient Management Program  Endocrinology Reassessment     Venu Peralta was referred by an Endocrinology provider to the Endocrinology Patient Management program offered by [facility] for Type 2 Diabetes. A follow-up outreach was conducted, including assessment of continued therapy appropriateness, medication adherence, and side effect incidence and management for Insulin Glargine, Jardiance, and Ozempic.    Changes to Insurance Coverage or Financial Support  No changes    Relevant Past Medical History and Comorbidities  Relevant medical history and concomitant health conditions were discussed with the patient. The patient's chart has been reviewed for relevant past medical history and comorbid health conditions and updated as necessary.   Past Medical History:   Diagnosis Date    CAD (coronary artery disease)     Diabetes mellitus     Disease of thyroid gland     Hyperlipidemia     Hypertension     Sleep apnea     Stomach ulcer     Type 2 diabetes mellitus     Vitamin D deficiency      Social History     Socioeconomic History    Marital status:    Tobacco Use    Smoking status: Never     Passive exposure: Never    Smokeless tobacco: Never   Substance and Sexual Activity    Alcohol use: No    Drug use: No    Sexual activity: Not Currently     Partners: Female     Birth control/protection: Partner of same sex     Problem list reviewed by Edgardo Sanchez RPH on 3/13/2025 at  3:35 PM    Hospitalizations and Urgent Care Since Last Assessment  ED Visits, Admissions, or Hospitalizations: none reported  Urgent Office Visits: none reported    Allergies  Known allergies and reactions were discussed with the patient. The patient's chart has been reviewed for allergy information and updated as necessary.   No Known Allergies  Allergies reviewed by Edgardo Sanchez RPH on 3/13/2025 at  3:35 PM    Relevant Laboratory Values  Relevant laboratory values were  "discussed with the patient. The following specialty medication dose adjustment(s) are recommended: Patient tolerating mounjaro 12.5mg weekly well so next fill we will increase mounjaro to 15mg weekly    No results found for: \"HGBA1C\"  Lab Results   Component Value Date    GLUCOSE 264 (H) 03/17/2017    CALCIUM 10.2 (H) 03/17/2017     03/17/2017    K 4.4 03/17/2017    CO2 27.9 03/17/2017    CL 99 03/17/2017    BUN 18 03/17/2017    CREATININE 1.12 03/17/2017    EGFRIFNONA 68 03/17/2017    BCR 16.1 03/17/2017    ANIONGAP 8.1 03/17/2017     No results found for: \"CHOL\", \"CHLPL\", \"TRIG\", \"HDL\", \"LDL\", \"LDLDIRECT\"    Current Medication List  This medication list has been reviewed with the patient and evaluated for any interactions or necessary modifications/recommendations, and updated to include all prescription medications, OTC medications, and supplements the patient is currently taking.  This list reflects what is contained in the patient's profile, which has also been marked as reviewed to communicate to other providers it is the most up to date version of the patient's current medication therapy.     Current Outpatient Medications:     Tirzepatide (Mounjaro) 15 MG/0.5ML solution auto-injector, Inject 15 mg under the skin into the appropriate area as directed 1 (One) Time Per Week. Indications: Type 2 Diabetes, Disp: 6 mL, Rfl: 1    amLODIPine (NORVASC) 10 MG tablet, Take 1 tablet by mouth Daily., Disp: , Rfl:     aspirin 81 MG chewable tablet, Chew 1 tablet Daily., Disp: , Rfl:     benzonatate (TESSALON) 100 MG capsule, Take 1 capsule by mouth 2 (Two) Times a Day As Needed., Disp: , Rfl:     Chest Congestion Relief 400 MG tablet, , Disp: , Rfl:     Continuous Glucose Sensor (Dexcom G6 Sensor), Inject  under the skin into the appropriate area as directed Every 10 (Ten) Days., Disp: 3 each, Rfl: 5    Continuous Glucose Transmitter (Dexcom G6 Transmitter) misc, , Disp: , Rfl:     DULoxetine (CYMBALTA) 60 MG " "capsule, Daily., Disp: , Rfl:     empagliflozin (Jardiance) 25 MG tablet tablet, Take 1 tablet by mouth Daily., Disp: 90 tablet, Rfl: 1    fenofibrate (TRICOR) 145 MG tablet, Take 1 tablet by mouth Daily., Disp: , Rfl:     ferrous sulfate 325 (65 FE) MG tablet, Take 1 tablet by mouth Every 12 (Twelve) Hours., Disp: , Rfl:     fluticasone (FLONASE) 50 MCG/ACT nasal spray, Administer 1 spray into the nostril(s) as directed by provider Daily., Disp: , Rfl:     folic acid (FOLVITE) 800 MCG tablet, Take 1 tablet by mouth Daily., Disp: , Rfl:     Insulin Glargine (Lantus SoloStar) 100 UNIT/ML injection pen, Inject 55 Units under the skin into the appropriate area as directed 2 (Two) Times a Day., Disp: 90 mL, Rfl: 1    Insulin Lispro (humaLOG) 100 UNIT/ML injection, Inject 200 Units under the skin into the appropriate area as directed Daily via insulin pump, Disp: 60 mL, Rfl: 2    Insulin Pen Needle (B-D ULTRAFINE III SHORT PEN) 31G X 8 MM misc, Inject 1 Needle under the skin into the appropriate area as directed 5 (Five) Times a Day., Disp: 500 each, Rfl: 1    Insulin Syringe 30G X 5/16\" 1 ML misc, Inject 1 each under the skin into the appropriate area As Directed., Disp: 100 each, Rfl: 2    levocetirizine (XYZAL) 5 MG tablet, levocetirizine 5 mg tablet  TK 1 T PO QPM, Disp: , Rfl:     levothyroxine (SYNTHROID, LEVOTHROID) 112 MCG tablet, levothyroxine 112 mcg tablet  TK 1 T PO QD, Disp: , Rfl:     losartan (COZAAR) 100 MG tablet, Take 1 tablet by mouth Daily., Disp: , Rfl:     metFORMIN ER (GLUCOPHAGE-XR) 500 MG 24 hr tablet, Take 2 tablets by mouth Daily With Breakfast., Disp: 180 tablet, Rfl: 1    metoprolol tartrate (LOPRESSOR) 100 MG tablet, Take 1 tablet by mouth 2 (Two) Times a Day., Disp: , Rfl:     pantoprazole (PROTONIX) 40 MG EC tablet, Take 1 tablet by mouth Daily., Disp: , Rfl:     rosuvastatin (CRESTOR) 40 MG tablet, Take 1 tablet by mouth Every Night., Disp: , Rfl:     valACYclovir (VALTREX) 1000 MG " tablet, Take 1 tablet by mouth Daily., Disp: , Rfl:     vitamin D (ERGOCALCIFEROL) 1.25 MG (07002 UT) capsule capsule, Take 1 capsule by mouth 1 (One) Time Per Week., Disp: , Rfl:     Medicines reviewed by Edgardo Sanchez [facility] on 3/13/2025 at  3:35 PM    Drug Interactions  No Clinically Significant DDIs Were Identified at Present Time Upon Marking Medications Reviewed      Recommended Medications Assessment  Aspirin: Currently Taking   Statin: Currently Taking   ACEi/ARB: Currently Taking     Adverse Drug Reactions  Medication tolerability: Tolerating with no to minimal ADRs  Medication plan: Continue therapy with normal follow-up  Plan for ADR Management: n/a    Adherence, Self-Administration, and Current Therapy Problems  Adherence related to the patient's specialty therapy was discussed with the patient. The Adherence segment of this outreach has been reviewed and updated.     Adherence Questions  Linked Medication(s) Assessed: Empagliflozin (JARDIANCE), Insulin Glargine (Lantus SoloStar), Tirzepatide (Mounjaro)  On average, how many doses/injections does the patient miss per month?: 0  What are the identified reasons for non-adherence or missed doses? : no problems identified  What is the estimated medication adherence level?: (S) % (Jardiance, Lantus, and Mounjaro PDC all falsely low. Had trouble with adherence in the past but since enrolling in our program 9/24/25 patient has filled meds on time and reports no missed doses)  Based on the patient/caregiver response and refill history, does this patient require an MTP to track adherence improvements?: no    Additional Barriers to Patient Self-Administration: none identified  Methods for Supporting Patient Self-Administration: n/a    Open Medication Therapy Problems  No medication therapy recommendations to display    Goals of Therapy  Goals related to the patient's specialty therapy were discussed with the patient. The Patient Goals segment of this  outreach has been reviewed and updated.   Goals Addressed Today        Specialty Pharmacy General Goal      A1C < 7 %     A1C= 8.6 on 9/13/2024 (from [facility]) -   New enrollment. A1C increased d/t the patient having trouble finding mounjaro in stock which has caused him to miss doses. We will help him manage this issue to help improve his A1C and get it to goal. NB    3/13/2025; Phone reassessment. No new lab values. Patient doing well on medication reports no side effects and no missed doses. He is doing well on mounjaro 12.5mg so on the next fill for mounjaro we will increase to 15mg weekly. NB                Quality of Life Assessment   Quality of Life related to the patient's enrollment in the patient management program and services provided was discussed with the patient. The QOL segment of this outreach has been reviewed and updated.  Quality of Life Improvement Scale: 8-Moderately better    Reassessment Plan & Follow-Up  1. Medication Therapy Changes: Will increase mounjaro to 15mg during next fill based on plan. Patient is tolerating 12.5mg well.  2. Related Plans, Therapy Recommendations, or Issues to Be Addressed: Will follow A1C during office visits next on 5/22/2025.  3. Pharmacist to perform regular assessments no more than (6) months from the previous assessment.  4. Care Coordinator to set up future refill outreaches, coordinate prescription delivery, and escalate clinical questions to pharmacist.    Attestation  Therapeutic appropriateness: Appropriate   I attest the patient was actively involved in and has agreed to the above plan of care.  If the prescribed therapy is at any point deemed not appropriate based on the current or future assessments, a consultation will be initiated with the patient's specialty care provider to determine the best course of action. The revised plan of therapy will be documented along with any required assessments and/or additional patient education provided.     Erick  Soledad Sanchez  Clinical Specialty Pharmacist, Endocrinology  3/13/2025  15:42 EDT    Discussed the aforementioned information with the patient via Telephone.

## 2025-03-25 ENCOUNTER — SPECIALTY PHARMACY (OUTPATIENT)
Age: 65
End: 2025-03-25
Payer: MEDICAID

## 2025-03-25 NOTE — PROGRESS NOTES
Specialty Pharmacy Patient Management Program  Endocrinology Refill Outreach      Venu is a 64 y.o. male contacted today regarding refills of his medication(s).    Specialty medication(s) and dose(s) confirmed: Jardiance.    Refill Questions      Flowsheet Row Most Recent Value   Changes to allergies? No   Changes to medications? No   New conditions or infections since last clinic visit No   Unplanned office visit, urgent care, ED, or hospital admission in the last 4 weeks  No   How does patient/caregiver feel medication is working? Very good   Financial problems or insurance changes  No   Since the previous refill, were any specialty medication doses or scheduled injections missed or delayed?  No   Does this patient require a clinical escalation to a pharmacist? No          Delivery Questions      Flowsheet Row Most Recent Value   Delivery method UPS   Delivery address verified with patient/caregiver? Yes   Delivery address Home   Number of medications in delivery 2   Medication(s) being filled and delivered Empagliflozin (JARDIANCE), metFORMIN HCl (GLUCOPHAGE-XR)   Doses left of specialty medications 1   Copay amount $0   Copay form of payment No copayment ($0)   Delivery Date Selection 03/26/25   Signature Required Yes            Follow-Up: 90d    Gustavo Bradford CPhT  Pharmacy Care Coordinator, Endocrinology  3/25/2025  11:31 EDT

## 2025-03-27 ENCOUNTER — SPECIALTY PHARMACY (OUTPATIENT)
Age: 65
End: 2025-03-27
Payer: MEDICAID

## 2025-03-27 NOTE — PROGRESS NOTES
Specialty Pharmacy Patient Management Program  Prior Authorization Approval     Prior Authorization for Mounjaro was Approved    Approval Start Date: 03/26/2025  Approval End Date: 9/226/2025    Scheduled new prior authorization renewal outreach task to be completed on 09/26/2025

## 2025-03-27 NOTE — PROGRESS NOTES
Specialty Pharmacy Patient Management Program  Endocrinology Refill Outreach      Venu is a 64 y.o. male contacted today regarding refills of his medication(s).    Specialty medication(s) and dose(s) confirmed: Mounjaro.    Refill Questions      Flowsheet Row Most Recent Value   Changes to allergies? No   Changes to medications? No   New conditions or infections since last clinic visit No   Unplanned office visit, urgent care, ED, or hospital admission in the last 4 weeks  No   How does patient/caregiver feel medication is working? Very good   Financial problems or insurance changes  No   Since the previous refill, were any specialty medication doses or scheduled injections missed or delayed?  No   Does this patient require a clinical escalation to a pharmacist? No          Delivery Questions      Flowsheet Row Most Recent Value   Delivery method UPS   Delivery address verified with patient/caregiver? Yes   Delivery address Home   Number of medications in delivery 1   Medication(s) being filled and delivered Tirzepatide (MOUNJARO)   Doses left of specialty medications 1   Copay verified? Yes   Copay amount $0   Copay form of payment No copayment ($0)   Delivery Date Selection 03/28/25   Signature Required Yes            Follow-Up: 28d    Gustavo Bradford CPhT  Pharmacy Care Coordinator, Endocrinology  3/27/2025  08:42 EDT

## 2025-04-14 ENCOUNTER — SPECIALTY PHARMACY (OUTPATIENT)
Age: 65
End: 2025-04-14
Payer: MEDICAID

## 2025-04-14 NOTE — PROGRESS NOTES
Specialty Pharmacy Patient Management Program  Endocrinology Refill Outreach      Venu is a 64 y.o. male contacted today regarding refills of his medication(s).    Specialty medication(s) and dose(s) confirmed: none.    Refill Questions      Flowsheet Row Most Recent Value   Changes to allergies? No   Changes to medications? No   New conditions or infections since last clinic visit No   Unplanned office visit, urgent care, ED, or hospital admission in the last 4 weeks  No   How does patient/caregiver feel medication is working? Very good   Financial problems or insurance changes  No   Since the previous refill, were any specialty medication doses or scheduled injections missed or delayed?  No   Does this patient require a clinical escalation to a pharmacist? No          Delivery Questions      Flowsheet Row Most Recent Value   Delivery method UPS   Delivery address verified with patient/caregiver? Yes   Delivery address Home   Number of medications in delivery 1   Medication(s) being filled and delivered Continuous Glucose Sensor (Dexcom G6 Sensor)   Doses left of specialty medications 1   Copay verified? Yes   Copay amount $0   Copay form of payment No copayment ($0)   Delivery Date Selection 04/15/25   Signature Required Yes            Follow-Up: 90d    Gustavo Bradford CPhT  Pharmacy Care Coordinator, Endocrinology  4/14/2025  08:05 EDT

## 2025-04-25 ENCOUNTER — SPECIALTY PHARMACY (OUTPATIENT)
Age: 65
End: 2025-04-25
Payer: MEDICAID

## 2025-04-25 NOTE — PROGRESS NOTES
Specialty Pharmacy Patient Management Program  Endocrinology Refill Outreach      Venu is a 64 y.o. male contacted today regarding refills of his medication(s).    Specialty medication(s) and dose(s) confirmed: Mounjaro.    Refill Questions      Flowsheet Row Most Recent Value   Changes to allergies? No   Changes to medications? No   New conditions or infections since last clinic visit No   Unplanned office visit, urgent care, ED, or hospital admission in the last 4 weeks  No   How does patient/caregiver feel medication is working? Very good   Financial problems or insurance changes  No   Since the previous refill, were any specialty medication doses or scheduled injections missed or delayed?  No   Does this patient require a clinical escalation to a pharmacist? No          Delivery Questions      Flowsheet Row Most Recent Value   Delivery method UPS   Delivery address verified with patient/caregiver? Yes   Delivery address Home   Number of medications in delivery 1   Medication(s) being filled and delivered Tirzepatide (Mounjaro)   Doses left of specialty medications 1   Copay verified? Yes   Copay amount $0   Copay form of payment No copayment ($0)   Delivery Date Selection 04/29/25   Signature Required Yes   Do you consent to receive electronic handouts?  No            Follow-Up: madi Bradford CPhT  Pharmacy Care Coordinator, Endocrinology  4/25/2025  09:59 EDT

## 2025-05-05 ENCOUNTER — SPECIALTY PHARMACY (OUTPATIENT)
Dept: GENERAL RADIOLOGY | Facility: HOSPITAL | Age: 65
End: 2025-05-05
Payer: MEDICAID

## 2025-05-05 ENCOUNTER — SPECIALTY PHARMACY (OUTPATIENT)
Age: 65
End: 2025-05-05
Payer: MEDICAID

## 2025-05-05 RX ORDER — INSULIN GLARGINE 100 [IU]/ML
55 INJECTION, SOLUTION SUBCUTANEOUS 2 TIMES DAILY
Qty: 90 ML | Refills: 1 | Status: SHIPPED | OUTPATIENT
Start: 2025-05-05

## 2025-05-05 NOTE — PROGRESS NOTES
Specialty Pharmacy Patient Management Program  Endocrinology Refill Outreach      Venu is a 64 y.o. male contacted today regarding refills of his medication(s).    Specialty medication(s) and dose(s) confirmed: Lantus.    Refill Questions      Flowsheet Row Most Recent Value   Changes to allergies? No   Changes to medications? No   New conditions or infections since last clinic visit No   Unplanned office visit, urgent care, ED, or hospital admission in the last 4 weeks  No   How does patient/caregiver feel medication is working? Very good   Financial problems or insurance changes  No   Since the previous refill, were any specialty medication doses or scheduled injections missed or delayed?  No   Does this patient require a clinical escalation to a pharmacist? No          Delivery Questions      Flowsheet Row Most Recent Value   Delivery method UPS   Delivery address verified with patient/caregiver? Yes   Delivery address Home   Number of medications in delivery 2   Medication(s) being filled and delivered Insulin Glargine (Lantus SoloStar), Insulin Lispro (humaLOG)   Doses left of specialty medications 1   Copay verified? Yes   Copay amount $0   Copay form of payment No copayment ($0)   Delivery Date Selection 05/06/25   Signature Required Yes   Do you consent to receive electronic handouts?  No            Follow-Up: 14d    Gustavo Bradford CPhT  Pharmacy Care Coordinator, Endocrinology  5/5/2025  12:38 EDT

## 2025-05-05 NOTE — PROGRESS NOTES
Specialty Pharmacy Patient Management Program  Per Protocol Prescription Order/Refill     Patient currently fills medications at [facility] and is enrolled in an Endocrinology Patient Management Program.     Requested Prescriptions     Pending Prescriptions Disp Refills    Insulin Glargine (Lantus SoloStar) 100 UNIT/ML injection pen 90 mL 1     Sig: Inject 55 Units under the skin into the appropriate area as directed 2 (Two) Times a Day.     Prescription orders above were sent to the pharmacy per Collaborative Care Agreement Protocol.     Erick Sanchez, PharmD  Clinical Specialty Pharmacist, Endocrinology  5/5/2025  11:03 EDT

## 2025-05-21 ENCOUNTER — SPECIALTY PHARMACY (OUTPATIENT)
Age: 65
End: 2025-05-21
Payer: MEDICAID

## 2025-05-21 NOTE — PROGRESS NOTES
Specialty Pharmacy Patient Management Program  Endocrinology Refill Outreach      Venu is a 64 y.o. male contacted today regarding refills of his medication(s).    Specialty medication(s) and dose(s) confirmed: Mounjaro.    Refill Questions      Flowsheet Row Most Recent Value   Changes to allergies? No   Changes to medications? No   New conditions or infections since last clinic visit No   Unplanned office visit, urgent care, ED, or hospital admission in the last 4 weeks  No   How does patient/caregiver feel medication is working? Very good   Financial problems or insurance changes  No   Since the previous refill, were any specialty medication doses or scheduled injections missed or delayed?  No   Does this patient require a clinical escalation to a pharmacist? No          Delivery Questions      Flowsheet Row Most Recent Value   Delivery method UPS   Delivery address verified with patient/caregiver? Yes   Delivery address Home   Number of medications in delivery 1   Medication(s) being filled and delivered Tirzepatide (Mounjaro)   Doses left of specialty medications 1   Copay verified? Yes   Copay amount $0   Copay form of payment No copayment ($0)   Delivery Date Selection 05/22/25   Signature Required Yes            Follow-Up: 28d    Gustavo Bradford CPhT  Pharmacy Care Coordinator, Endocrinology  5/21/2025  10:12 EDT

## 2025-05-22 ENCOUNTER — OFFICE VISIT (OUTPATIENT)
Dept: ENDOCRINOLOGY | Facility: CLINIC | Age: 65
End: 2025-05-22
Payer: MEDICAID

## 2025-05-22 ENCOUNTER — SPECIALTY PHARMACY (OUTPATIENT)
Dept: PHARMACY | Facility: HOSPITAL | Age: 65
End: 2025-05-22
Payer: MEDICAID

## 2025-05-22 VITALS
SYSTOLIC BLOOD PRESSURE: 130 MMHG | WEIGHT: 236.2 LBS | OXYGEN SATURATION: 97 % | DIASTOLIC BLOOD PRESSURE: 60 MMHG | HEART RATE: 78 BPM | BODY MASS INDEX: 35.91 KG/M2

## 2025-05-22 DIAGNOSIS — E11.65 UNCONTROLLED DIABETES MELLITUS WITH HYPERGLYCEMIA, WITH LONG-TERM CURRENT USE OF INSULIN: Primary | ICD-10-CM

## 2025-05-22 DIAGNOSIS — Z79.4 UNCONTROLLED DIABETES MELLITUS WITH HYPERGLYCEMIA, WITH LONG-TERM CURRENT USE OF INSULIN: Primary | ICD-10-CM

## 2025-05-22 LAB
EXPIRATION DATE: ABNORMAL
HBA1C MFR BLD: 7.3 % (ref 4.5–5.7)
Lab: ABNORMAL

## 2025-05-22 RX ORDER — FOLIC ACID 0.4 MG
800 TABLET ORAL DAILY
COMMUNITY
Start: 2025-04-21

## 2025-05-22 RX ORDER — INSULIN LISPRO 100 [IU]/ML
200 INJECTION, SOLUTION INTRAVENOUS; SUBCUTANEOUS DAILY
Qty: 60 ML | Refills: 2 | Status: SHIPPED | OUTPATIENT
Start: 2025-05-22

## 2025-05-22 RX ORDER — PROCHLORPERAZINE 25 MG/1
1 SUPPOSITORY RECTAL
Qty: 1 EACH | Refills: 1 | Status: SHIPPED | OUTPATIENT
Start: 2025-05-22

## 2025-05-22 RX ORDER — PROCHLORPERAZINE 25 MG/1
SUPPOSITORY RECTAL
Qty: 3 EACH | Refills: 5 | Status: SHIPPED | OUTPATIENT
Start: 2025-05-22

## 2025-05-22 RX ORDER — DEXLANSOPRAZOLE 60 MG/1
60 CAPSULE, DELAYED RELEASE ORAL DAILY
COMMUNITY
Start: 2025-04-18

## 2025-05-22 RX ORDER — TIRZEPATIDE 15 MG/.5ML
15 INJECTION, SOLUTION SUBCUTANEOUS WEEKLY
Qty: 6 ML | Refills: 1 | Status: SHIPPED | OUTPATIENT
Start: 2025-05-22

## 2025-05-22 RX ORDER — TAMSULOSIN HYDROCHLORIDE 0.4 MG/1
1 CAPSULE ORAL DAILY
COMMUNITY
Start: 2025-03-07

## 2025-05-22 NOTE — ASSESSMENT & PLAN NOTE
Eyes- up to date  Kidneys and lipids up to date  Feet are good  --------  A1c is better   Plan :  stay off of lantus.. stop metformin

## 2025-05-22 NOTE — PROGRESS NOTES
Specialty Pharmacy Patient Management Program  Endocrinology Reassessment     Venu Peralta was referred by an Endocrinology provider to the Endocrinology Patient Management program offered by [facility] for Type 2 Diabetes. A follow-up outreach was conducted, including assessment of continued therapy appropriateness, medication adherence, and side effect incidence and management for Insulin Glargine, Jardiance, and Ozempic.    Patient is currently taking Jardiance 25 mg daily, Metformin 1000 mg daily, Mounjaro 15 mg weekly, and Humalog via insulin pump. He checks BG via Dexcom G6 CGM with levels between 150-170. Patient reports rare low BG <70, occurring twice monthly and he will eat a snack to help with this. He denies any adverse side effects at this time.     Patient reports occasional yeast infections a few times a year.     Changes to Insurance Coverage or Financial Support  No changes    Relevant Past Medical History and Comorbidities  Relevant medical history and concomitant health conditions were discussed with the patient. The patient's chart has been reviewed for relevant past medical history and comorbid health conditions and updated as necessary.   Past Medical History:   Diagnosis Date    CAD (coronary artery disease)     Diabetes mellitus     Disease of thyroid gland     Hyperlipidemia     Hypertension     Sleep apnea     Stomach ulcer     Type 2 diabetes mellitus     Vitamin D deficiency      Social History     Socioeconomic History    Marital status:    Tobacco Use    Smoking status: Never     Passive exposure: Never    Smokeless tobacco: Never   Substance and Sexual Activity    Alcohol use: No    Drug use: No    Sexual activity: Not Currently     Partners: Female     Birth control/protection: Partner of same sex     Problem list reviewed by Muriel Chaves RPH on 5/22/2025 at  8:55 AM    Hospitalizations and Urgent Care Since Last Assessment  ED Visits,  "Admissions, or Hospitalizations: none reported  Urgent Office Visits: none reported    Allergies  Known allergies and reactions were discussed with the patient. The patient's chart has been reviewed for allergy information and updated as necessary.   No Known Allergies  Allergies reviewed by Muriel Chaves RPH on 5/22/2025 at  8:55 AM    Relevant Laboratory Values  Relevant laboratory values were discussed with the patient. The following specialty medication dose adjustment(s) are recommended: Patient tolerating mounjaro 12.5mg weekly well so next fill we will increase mounjaro to 15mg weekly  A1C Last 3 Results          5/22/2025    08:55   HGBA1C Last 3 Results   Hemoglobin A1C 7.3      Lab Results   Component Value Date    HGBA1C 7.3 (A) 05/22/2025     Lab Results   Component Value Date    GLUCOSE 264 (H) 03/17/2017    CALCIUM 10.2 (H) 03/17/2017     03/17/2017    K 4.4 03/17/2017    CO2 27.9 03/17/2017    CL 99 03/17/2017    BUN 18 03/17/2017    CREATININE 1.12 03/17/2017    EGFRIFNONA 68 03/17/2017    BCR 16.1 03/17/2017    ANIONGAP 8.1 03/17/2017     No results found for: \"CHOL\", \"CHLPL\", \"TRIG\", \"HDL\", \"LDL\", \"LDLDIRECT\"    Current Medication List  This medication list has been reviewed with the patient and evaluated for any interactions or necessary modifications/recommendations, and updated to include all prescription medications, OTC medications, and supplements the patient is currently taking.  This list reflects what is contained in the patient's profile, which has also been marked as reviewed to communicate to other providers it is the most up to date version of the patient's current medication therapy.     Current Outpatient Medications:     amLODIPine (NORVASC) 10 MG tablet, Take 1 tablet by mouth Daily., Disp: , Rfl:     aspirin 81 MG chewable tablet, Chew 1 tablet Daily., Disp: , Rfl:     benzonatate (TESSALON) 100 MG capsule, Take 1 capsule by mouth 2 (Two) Times a Day As Needed., Disp: , " "Rfl:     Chest Congestion Relief 400 MG tablet, , Disp: , Rfl:     Continuous Glucose Sensor (Dexcom G6 Sensor), Inject  under the skin into the appropriate area as directed Every 10 (Ten) Days., Disp: 3 each, Rfl: 5    Continuous Glucose Transmitter (Dexcom G6 Transmitter) misc, , Disp: , Rfl:     dexlansoprazole (DEXILANT) 60 MG capsule, Take 1 capsule by mouth Daily., Disp: , Rfl:     DULoxetine (CYMBALTA) 60 MG capsule, Take 1 capsule by mouth 2 (Two) Times a Day., Disp: , Rfl:     empagliflozin (Jardiance) 25 MG tablet tablet, Take 1 tablet by mouth Daily., Disp: 90 tablet, Rfl: 1    fenofibrate (TRICOR) 145 MG tablet, Take 1 tablet by mouth Daily., Disp: , Rfl:     ferrous sulfate 325 (65 FE) MG tablet, Take 1 tablet by mouth Every 12 (Twelve) Hours., Disp: , Rfl:     fluticasone (FLONASE) 50 MCG/ACT nasal spray, Administer 1 spray into the nostril(s) as directed by provider Daily., Disp: , Rfl:     folic acid (FOLVITE) 400 MCG tablet, Take 2 tablets by mouth Daily., Disp: , Rfl:     Insulin Lispro (humaLOG) 100 UNIT/ML injection, Inject 200 Units under the skin into the appropriate area as directed Daily via insulin pump, Disp: 60 mL, Rfl: 2    Insulin Pen Needle (B-D ULTRAFINE III SHORT PEN) 31G X 8 MM misc, Inject 1 Needle under the skin into the appropriate area as directed 5 (Five) Times a Day., Disp: 500 each, Rfl: 1    Insulin Syringe 30G X 5/16\" 1 ML misc, Inject 1 each under the skin into the appropriate area As Directed., Disp: 100 each, Rfl: 2    levocetirizine (XYZAL) 5 MG tablet, levocetirizine 5 mg tablet  TK 1 T PO QPM, Disp: , Rfl:     levothyroxine (SYNTHROID, LEVOTHROID) 112 MCG tablet, levothyroxine 112 mcg tablet  TK 1 T PO QD, Disp: , Rfl:     losartan (COZAAR) 100 MG tablet, Take 1 tablet by mouth Daily., Disp: , Rfl:     metoprolol tartrate (LOPRESSOR) 100 MG tablet, Take 1 tablet by mouth 2 (Two) Times a Day., Disp: , Rfl:     rosuvastatin (CRESTOR) 40 MG tablet, Take 1 tablet by mouth " Every Night., Disp: , Rfl:     tamsulosin (FLOMAX) 0.4 MG capsule 24 hr capsule, Take 1 capsule by mouth Daily., Disp: , Rfl:     Tirzepatide (Mounjaro) 15 MG/0.5ML solution auto-injector, Inject 15 mg under the skin into the appropriate area as directed 1 (One) Time Per Week. Indications: Type 2 Diabetes, Disp: 6 mL, Rfl: 1    valACYclovir (VALTREX) 1000 MG tablet, Take 1 tablet by mouth Daily., Disp: , Rfl:     vitamin D (ERGOCALCIFEROL) 1.25 MG (16175 UT) capsule capsule, Take 1 capsule by mouth 1 (One) Time Per Week., Disp: , Rfl:     Medicines reviewed by Muriel Chaves RPH on 5/22/2025 at  8:55 AM  Medicines reviewed by Muriel Chaves RPH on 5/22/2025 at  9:00 AM  Medicines reviewed by Muriel Chaves RPH on 5/22/2025 at  9:10 AM  Medicines reviewed by Muriel Chaves RPH on 5/22/2025 at  9:14 AM    Drug Interactions  No Clinically Significant DDIs Were Identified at Present Time Upon Marking Medications Reviewed      Recommended Medications Assessment  Aspirin: Currently Taking   Statin: Currently Taking   ACEi/ARB: Currently Taking     Adverse Drug Reactions  Medication tolerability: Tolerating with no to minimal ADRs  Medication plan: Continue therapy with normal follow-up  Plan for ADR Management: n/a    Adherence, Self-Administration, and Current Therapy Problems  Adherence related to the patient's specialty therapy was discussed with the patient. The Adherence segment of this outreach has been reviewed and updated.     Adherence Questions  Linked Medication(s) Assessed: Empagliflozin (JARDIANCE), Insulin Glargine (Lantus SoloStar), Tirzepatide (Mounjaro)  On average, how many doses/injections does the patient miss per month?: 0  What are the identified reasons for non-adherence or missed doses? : no problems identified  What is the estimated medication adherence level?: %  Based on the patient/caregiver response and refill history, does this patient require an MTP to track adherence  improvements?: no    Additional Barriers to Patient Self-Administration: none identified  Methods for Supporting Patient Self-Administration: n/a    Open Medication Therapy Problems  No medication therapy recommendations to display    Goals of Therapy  Goals related to the patient's specialty therapy were discussed with the patient. The Patient Goals segment of this outreach has been reviewed and updated.   Goals Addressed Today        HEMOGLOBIN A1C < 7      Specialty Pharmacy General Goal      A1C < 7 %     A1C has improved at 7.3 on 5/22/25. KB    A1C= 8.6 on 9/13/2024 (from [facility]) -   New enrollment. A1C increased d/t the patient having trouble finding mounjaro in stock which has caused him to miss doses. We will help him manage this issue to help improve his A1C and get it to goal. NB    3/13/2025; Phone reassessment. No new lab values. Patient doing well on medication reports no side effects and no missed doses. He is doing well on mounjaro 12.5mg so on the next fill for mounjaro we will increase to 15mg weekly. NB                Quality of Life Assessment   Quality of Life related to the patient's enrollment in the patient management program and services provided was discussed with the patient. The QOL segment of this outreach has been reviewed and updated.  Quality of Life Improvement Scale: 8-Moderately better    Reassessment Plan & Follow-Up  1. Medication Therapy Changes: Per verbal order from Alexander Butts MD:  Continue Humalog via insulin pump  Continue Jardiance 25 mg daily   Continue Mounjaro 15 mg weekly   Continue Dexcom G6 CGM  Stop Metformin 1000 mg daily   Stop Lantus 55 units BID  2. Related Plans, Therapy Recommendations, or Issues to Be Addressed:    Will send prescriptions to [facility] for medications to be shipped to patient.   3. Pharmacist to perform regular assessments no more than (6) months from the previous assessment.  4. Care Coordinator to set up future refill outreaches, coordinate  prescription delivery, and escalate clinical questions to pharmacist.    Attestation  Therapeutic appropriateness: Appropriate   I attest the patient was actively involved in and has agreed to the above plan of care.  If the prescribed therapy is at any point deemed not appropriate based on the current or future assessments, a consultation will be initiated with the patient's specialty care provider to determine the best course of action. The revised plan of therapy will be documented along with any required assessments and/or additional patient education provided.     Muriel Chaves RPH   5/22/2025  09:26 EDT

## 2025-05-22 NOTE — PROGRESS NOTES
Office Note      Date: 2025  Patient Name: Venu Peralta  MRN: [id number]  : 1960    Chief Complaint   Patient presents with    Follow-up     Uncontrolled diabetes mellitus with hyperglycemia, with long-term current use of insulin           History of Present Illness:   Venu Peralta is a 64 y.o. male who presents for Diabetes type 2.   Current RX  insulin via pump/ jardiance/metformimn/ mounjaro   He tolerates these ok. He is not taking lantus any more     Bg checks are done: dexcom   Hypoglycemia :none       Last A1c:  Hemoglobin A1C   Date Value Ref Range Status   2025 7.3 (A) 4.5 - 5.7 % Final       Changes in health since last visit: none.    Subjective            Review of Systems:   Review of Systems   Constitutional:  Positive for fatigue.   Respiratory:  Negative for chest tightness and shortness of breath.    Skin:  Negative for wound.       The following portions of the patient's history were reviewed and updated as appropriate: allergies, current medications, past family history, past medical history, past social history, past surgical history, and problem list.    Objective     Visit Vitals  /60 (BP Location: Right arm, Patient Position: Sitting, Cuff Size: Adult)   Pulse 78   Wt 107 kg (236 lb 3.2 oz)   SpO2 97%   BMI 35.91 kg/m²           Physical Exam:  Physical Exam  Vitals reviewed.   Constitutional:       Appearance: Normal appearance.   Neurological:      Mental Status: He is alert.   Psychiatric:         Mood and Affect: Mood normal.         Behavior: Behavior normal.         Thought Content: Thought content normal.         Judgment: Judgment normal.          Assessment / Plan      Assessment & Plan:  Problem List Items Addressed This Visit       Uncontrolled diabetes mellitus with hyperglycemia, with long-term current use of insulin - Primary    Current Assessment & Plan    Eyes- up to date  Kidneys and lipids up to date  Feet are good  --------  A1c is better    Plan :  stay off of lantus.. stop metformin          Relevant Medications    empagliflozin (Jardiance) 25 MG tablet tablet    Insulin Lispro (humaLOG) 100 UNIT/ML injection    Tirzepatide (Mounjaro) 15 MG/0.5ML solution auto-injector    Other Relevant Orders    POC Glycosylated Hemoglobin (Hb A1C) (Completed)         Electronically signed by : Alexander Butts MD  05/22/2025

## 2025-06-16 ENCOUNTER — SPECIALTY PHARMACY (OUTPATIENT)
Dept: PHARMACY | Facility: HOSPITAL | Age: 65
End: 2025-06-16
Payer: MEDICAID

## 2025-06-16 NOTE — PROGRESS NOTES
Specialty Pharmacy Patient Management Program  Refill Outreach     Venu was contacted today regarding refills of their medication(s).    Refill Questions      Flowsheet Row Most Recent Value   Changes to allergies? No   Changes to medications? No   New conditions or infections since last clinic visit No   Unplanned office visit, urgent care, ED, or hospital admission in the last 4 weeks  No   How does patient/caregiver feel medication is working? Very good   Financial problems or insurance changes  No   Since the previous refill, were any specialty medication doses or scheduled injections missed or delayed?  No   Does this patient require a clinical escalation to a pharmacist? No            Delivery Questions      Flowsheet Row Most Recent Value   Delivery method UPS   Delivery address verified with patient/caregiver? Yes   Delivery address Home   Number of medications in delivery 3   Medication(s) being filled and delivered Insulin Lispro (humaLOG), Tirzepatide (Mounjaro), Empagliflozin (JARDIANCE)   Doses left of specialty medications na   Copay verified? Yes   Copay amount $0.00   Copay form of payment No copayment ($0)   Delivery Date Selection 06/18/25   Signature Required Yes   Do you consent to receive electronic handouts?  Yes                 Follow-up: 30 day(s)     Lynne Shah, Pharmacy Technician  6/16/2025  15:35 EDT     Stable, continue present management

## 2025-06-25 ENCOUNTER — SPECIALTY PHARMACY (OUTPATIENT)
Age: 65
End: 2025-06-25
Payer: MEDICAID

## 2025-06-25 NOTE — PROGRESS NOTES
Specialty Pharmacy Patient Management Program  Endocrinology Refill Outreach      Venu is a 64 y.o. male contacted today regarding refills of his medication(s).    Specialty medication(s) and dose(s) confirmed: none.    Refill Questions      Flowsheet Row Most Recent Value   Changes to allergies? No   Changes to medications? No   New conditions or infections since last clinic visit No   Unplanned office visit, urgent care, ED, or hospital admission in the last 4 weeks  No   How does patient/caregiver feel medication is working? Very good   Financial problems or insurance changes  No   Since the previous refill, were any specialty medication doses or scheduled injections missed or delayed?  No   Does this patient require a clinical escalation to a pharmacist? No          Delivery Questions      Flowsheet Row Most Recent Value   Delivery method UPS   Delivery address verified with patient/caregiver? Yes   Delivery address Home   Number of medications in delivery 1   Medication(s) being filled and delivered Continuous Glucose Sensor (Dexcom G6 Sensor)   Doses left of specialty medications 1   Copay verified? Yes   Copay amount $0.00   Copay form of payment No copayment ($0)   Delivery Date Selection 06/26/25   Signature Required Yes   Do you consent to receive electronic handouts?  Yes            Follow-Up: [place]    Gustavo Bradford CPhT  Pharmacy Care Coordinator, Endocrinology  6/25/2025  10:31 EDT

## 2025-07-22 ENCOUNTER — SPECIALTY PHARMACY (OUTPATIENT)
Dept: PHARMACY | Facility: HOSPITAL | Age: 65
End: 2025-07-22
Payer: MEDICAID

## 2025-07-22 NOTE — PROGRESS NOTES
Specialty Pharmacy Patient Management Program  Refill Outreach     Venu was contacted today regarding refills of their medication(s).    Refill Questions      Flowsheet Row Most Recent Value   Changes to allergies? No   Changes to medications? No   New conditions or infections since last clinic visit No   Unplanned office visit, urgent care, ED, or hospital admission in the last 4 weeks  No   How does patient/caregiver feel medication is working? Very good   Financial problems or insurance changes  No   Since the previous refill, were any specialty medication doses or scheduled injections missed or delayed?  No   Does this patient require a clinical escalation to a pharmacist? No            Delivery Questions      Flowsheet Row Most Recent Value   Delivery method UPS   Delivery address verified with patient/caregiver? Yes   Delivery address Home   Number of medications in delivery 3   Medication(s) being filled and delivered Insulin Lispro (humaLOG), Tirzepatide (Mounjaro), Continuous Glucose Sensor (Dexcom G6 Sensor)   Doses left of specialty medications na   Copay verified? Yes   Copay amount $0.00   Copay form of payment No copayment ($0)   Delivery Date Selection 07/23/25   Signature Required Yes   Do you consent to receive electronic handouts?  Yes                 Follow-up: 30 day(s)     Lynne Shah, Pharmacy Technician  7/22/2025  10:42 EDT

## 2025-08-28 ENCOUNTER — OFFICE VISIT (OUTPATIENT)
Dept: ENDOCRINOLOGY | Facility: CLINIC | Age: 65
End: 2025-08-28
Payer: MEDICARE

## 2025-08-28 VITALS
DIASTOLIC BLOOD PRESSURE: 47 MMHG | BODY MASS INDEX: 37.19 KG/M2 | SYSTOLIC BLOOD PRESSURE: 147 MMHG | WEIGHT: 244.6 LBS | OXYGEN SATURATION: 97 % | HEART RATE: 79 BPM

## 2025-08-28 DIAGNOSIS — E11.65 UNCONTROLLED DIABETES MELLITUS WITH HYPERGLYCEMIA, WITH LONG-TERM CURRENT USE OF INSULIN: Primary | ICD-10-CM

## 2025-08-28 DIAGNOSIS — Z79.4 UNCONTROLLED DIABETES MELLITUS WITH HYPERGLYCEMIA, WITH LONG-TERM CURRENT USE OF INSULIN: Primary | ICD-10-CM

## 2025-08-28 LAB
EXPIRATION DATE: ABNORMAL
HBA1C MFR BLD: 7.2 % (ref 4.5–5.7)
Lab: ABNORMAL